# Patient Record
Sex: MALE | Race: WHITE
[De-identification: names, ages, dates, MRNs, and addresses within clinical notes are randomized per-mention and may not be internally consistent; named-entity substitution may affect disease eponyms.]

---

## 2019-10-22 NOTE — ED GU-MALE
General


Stated Complaint:  UTI


Source:  patient


Exam Limitations:  no limitations





History of Present Illness


Date Seen by Provider:  Oct 22, 2019


Time Seen by Provider:  23:20


Initial Comments


The patient is a pleasant 69-year-old male presents for evaluation of 6 days of 

dysuria. He states that last week Wednesday he went to an urgent care for 

dysuria, was diagnosed with UTI, and was sent home with 3 days of antibiotics. 

He is not sure which antibiotic he was prescribed but he states that he 

completed. A few days later his symptoms returned and now he states he is 

urinating every 5 minutes. He denies fevers or chills, back or flank pain, 

abdominal pain, nausea or vomiting, penile or testicular pain, or hematuria.





The pt is later able to tell us he was placed on Macrobid.


Timing/Duration:  week (1)


Severity/Quality:  moderate


Location:  suprapubic


Radiation:  none


Activities at Onset:  none


Prior Genitourinary Problems:  none


Associated Symptoms:  dysuria





Allergies and Home Medications


Allergies


Coded Allergies:  


     No Known Drug Allergies (Unverified , 10/22/19)





Patient Home Medication List


Home Medication List Reviewed:  Yes





Review of Systems


Review of Systems


Constitutional:  no symptoms reported


EENTM:  no symptoms reported


Respiratory:  no symptoms reported


Cardiovascular:  no symptoms reported


Gastrointestinal:  no symptoms reported


Genitourinary:  burning, dysuria, urgency


Musculoskeletal:  no symptoms reported


Skin:  no symptoms reported


Psychiatric/Neurological:  No Symptoms Reported


Endocrine:  No Symptoms Reported


Hematologic/Lymphatic:  No Symptoms Reported





All Other Systemes Reviewed


Negative Unless Noted:  Yes





Past Medical-Social-Family Hx


Past Med/Social Hx:  Reviewed Nursing Past Med/Soc Hx


Patient Social History


Recent Foreign Travel:  No


Contact w/Someone Who Travel:  No





Physical Exam


Vital Signs





Vital Signs - First Documented








 10/22/19





 23:37


 


Temp 36.9


 


Pulse 51


 


Resp 18


 


B/P (MAP) 158/71 (100)


 


Pulse Ox 98


 


O2 Delivery Room Air





Capillary Refill :


Height, Weight, BMI


Height: '"


Weight: lbs. oz. kg;  BMI


Method:


General Appearance:  WD/WN, no apparent distress


HEENT:  PERRL/EOMI, pharynx normal


Neck:  non-tender, full range of motion


Cardiovascular:  regular rate, rhythm, no edema, no murmur


Respiratory:  chest non-tender, lungs clear, normal breath sounds, no 

respiratory distress, no accessory muscle use


Gastrointestinal:  normal bowel sounds, non tender, tenderness (mild suprapubic 

ttp)


Back:  normal inspection, no CVA tenderness, no vertebral tenderness


Extremities:  normal range of motion, no pedal edema, no calf tenderness


Neurologic/Psychiatric:  CNs II-XII nml as tested, no motor/sensory deficits, 

alert, normal mood/affect, oriented x 3


Skin:  normal color, warm/dry





Progress/Results/Core Measures


Suspected Sepsis


SIRS


Temperature: 


Pulse:  


Respiratory Rate: 


 


Blood Pressure  / 


Mean:





Results/Orders


Lab Results





Laboratory Tests








Test


 10/22/19


23:40 Range/Units


 


 


Urine Color PALE YELLOW   


 


Urine Clarity SL CLOUDY   


 


Urine pH 6.5  5-9  


 


Urine Specific Gravity <=1.005  1.016-1.022  


 


Urine Protein NEGATIVE  NEGATIVE  


 


Urine Glucose (UA) NEGATIVE  NEGATIVE  


 


Urine Ketones NEGATIVE  NEGATIVE  


 


Urine Nitrite NEGATIVE  NEGATIVE  


 


Urine Bilirubin NEGATIVE  NEGATIVE  


 


Urine Urobilinogen 0.2  NORMAL  MG/DL


 


Urine Leukocyte Esterase 2+ H NEGATIVE  


 


Urine RBC (Auto) 1+ H NEGATIVE  


 


Urine RBC 5-10 H  /HPF


 


Urine WBC >100 H  /HPF


 


Urine Squamous Epithelial


Cells NONE 


  /HPF





 


Urine Crystals NONE   /LPF


 


Urine Bacteria FEW H  /HPF


 


Urine Casts NONE   /LPF


 


Urine Mucus NEGATIVE   /LPF


 


Urine Culture Indicated YES   








My Orders





Orders - HUSEYIN BRIGGS DO


Ua Culture If Indicated (10/22/19 23:17)


Urine Culture (10/22/19 23:40)





Vital Signs/I&O











 10/22/19





 23:37


 


Temp 36.9


 


Pulse 51


 


Resp 18


 


B/P (MAP) 158/71 (100)


 


Pulse Ox 98


 


O2 Delivery Room Air





Capillary Refill :


Progress Note :  


Progress Note


@2355 - the patient has evidence of significant UTI. He will go home with a 

prescription for ciprofloxacin and will be given one tablet prior to discharge. 

Advised the patient to follow up with his PCP in the next 1-2 days and to return

to the emergency Department immediately for new or worsening symptoms.





Departure


Impression





   Primary Impression:  


   Acute UTI


Disposition:  01 HOME, SELF-CARE


Condition:  Stable





Departure-Patient Inst.


Decision time for Depature:  00:00


Referrals:  


NO,LOCAL PHYSICIAN (PCP)


Primary Care Physician


Patient Instructions:  Urinary Tract Infection, Adult (DC)





Add. Discharge Instructions:  


Take the prescribed medicine instructed. Return to the emergency Department 

immediately for new or worsening symptoms. Follow-up with your doctor in the 

next 1-2 days.


Scripts


Phenazopyridine HCl (Pyridium) 200 Mg Tablet


1 TAB PO Q8H for 5 Days, #15 TAB


   Prov: HUSEYIN BRIGGS DO         10/23/19 


Ciprofloxacin HCl (Ciprofloxacin HCl) 500 Mg Tablet


500 MG PO BID for 7 Days, #14 TAB


   Prov: HUSEYIN BRIGGS DO         10/23/19











HUSEYIN BRIGGS DO              Oct 22, 2019 23:33

## 2019-12-17 NOTE — DIAGNOSTIC IMAGING REPORT
MRI LT LOWER EXT JOINT W/O



TECHNIQUE: Multiplanar, multisequence MR imaging of the left knee

was performed without contrast.



COMPARISON: None available. 



INDICATION: Left knee pain. No known injury.



FINDINGS:



MENISCI 

Medial meniscus: Complex tear of the posterior horn of the medial

meniscus has a vertical longitudinal component near the free edge

resulting in a parrot beak configuration. There is also

horizontal cleavage tearing present in the undersurface.

Lateral meniscus: Normal.



LIGAMENTS

ACL: Intact.

PCL: Intact.

MCL: Intact.

LCL: The lateral collateral ligamentous complex is intact.



EXTENSOR MECHANISM

The extensor mechanism is intact.



CARTILAGE

Medial compartment: Medial compartment articular cartilage is

well preserved without focal high-grade chondromalacia.

Lateral compartment: The lateral compartment articular cartilage

is preserved without high-grade chondromalacia.

Patellofemoral compartment: Focal partial-thickness chondral

fibrillation and fissuring at the patellar apex. Remainder of the

articular cartilage throughout the patellofemoral compartment is

preserved.



BONE

No fracture, stress fracture or osteonecrosis. 



SOFT TISSUE

No knee effusion or Baker's cyst.



IMPRESSION: 

1. Complex tear in the posterior horn of the medial meniscus

likely has an unstable free edge margin.

2. Focal partial-thickness chondromalacia at the patellar apex.

Otherwise, articular cartilage throughout the knee is well

preserved.



Dictated by: 



  Dictated on workstation # [id number]

## 2019-12-26 NOTE — HISTORY AND PHYSICAL
DATE OF SERVICE:  



ADMISSION HISTORY AND PHYSICAL



Date of service and surgery will be 01/08/2020 for left knee arthroscopy.



HISTORY:

The patient is a 69-year-old gentleman with complaints of left knee pain.  He

reports pain on the medial aspect of his knee, worse with pivoting and twisting.

 He is very active.  He underwent an MRI shows a complex tear of the medial

meniscus.  Due to functional impairment and failure to improve with conservative

measures, the patient elected to proceed with surgical intervention.



REVIEW OF SYSTEMS:

No chest pain, no shortness of breath, no dysuria.



PAST MEDICAL HISTORY:

BPH, coronary artery disease, trigger finger.



PAST SURGICAL HISTORY:

Appendectomy, trigger finger release, coronary stent placement.



FAMILY HISTORY:

Significant for kidney stones, prostate cancer.



PRIMARY CARE PROVIDER:

_____.



MEDICATIONS:

Finasteride, atorvastatin, aspirin, isosorbide, clopidogrel, tamsulosin,

nitroglycerin.



ALLERGIES:

No known drug allergies.



SOCIAL HISTORY:

The patient denies alcohol, tobacco use.



RADIOGRAPHS:

Reveal mild medial and patellofemoral joint space narrowing.



PHYSICAL EXAMINATION:

GENERAL:  The patient is well developed, well nourished, in no acute distress.

HEENT:  Normocephalic, atraumatic.  Pupils are equal, round, reactive to light. 

Oropharynx is clear.

NECK:  Supple, no lymphadenopathy.

LUNGS:  Clear to auscultation bilaterally.

HEART:  Regular rate and rhythm.

ABDOMEN:  Soft, nontender, nondistended.

EXTREMITIES:  The left knee demonstrates moderate effusions, tender along his

medial joint line pain medially with Ale's.  No varus valgus laxity. 

Negative anterior and posterior drawer.  Range of motion is 0/2/125.



IMPRESSION:

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



PLAN:

Left knee arthroscopy, chondroplasty, partial medial meniscectomy.  The risks,

benefits, options, ramifications and recovery were discussed at length with the

patient.  He understands and wishes to proceed.





Job ID: [id number]

DocumentID: [id number]

Dictated Date:  12/26/2019 13:03:12

Transcription Date: 12/26/2019 13:50:29

Dictated By: DEIDRA FREITAS MD

## 2020-01-02 ENCOUNTER — HOSPITAL ENCOUNTER (OUTPATIENT)
Dept: HOSPITAL 75 - PREOP | Age: 70
Discharge: HOME | End: 2020-01-02
Attending: ORTHOPAEDIC SURGERY
Payer: MEDICARE

## 2020-01-02 VITALS — SYSTOLIC BLOOD PRESSURE: 117 MMHG | DIASTOLIC BLOOD PRESSURE: 66 MMHG

## 2020-01-02 VITALS — WEIGHT: 205.69 LBS | BODY MASS INDEX: 30.47 KG/M2 | HEIGHT: 68.9 IN

## 2020-01-02 DIAGNOSIS — Z01.818: Primary | ICD-10-CM

## 2020-01-02 PROCEDURE — 87081 CULTURE SCREEN ONLY: CPT

## 2020-01-08 ENCOUNTER — HOSPITAL ENCOUNTER (OUTPATIENT)
Dept: HOSPITAL 75 - SDC | Age: 70
Discharge: HOME | End: 2020-01-08
Attending: ORTHOPAEDIC SURGERY
Payer: MEDICARE

## 2020-01-08 VITALS — SYSTOLIC BLOOD PRESSURE: 114 MMHG | DIASTOLIC BLOOD PRESSURE: 67 MMHG

## 2020-01-08 VITALS — DIASTOLIC BLOOD PRESSURE: 72 MMHG | SYSTOLIC BLOOD PRESSURE: 118 MMHG

## 2020-01-08 VITALS — BODY MASS INDEX: 28.38 KG/M2 | WEIGHT: 191.58 LBS | HEIGHT: 69.02 IN

## 2020-01-08 VITALS — DIASTOLIC BLOOD PRESSURE: 58 MMHG | SYSTOLIC BLOOD PRESSURE: 97 MMHG

## 2020-01-08 VITALS — SYSTOLIC BLOOD PRESSURE: 137 MMHG | DIASTOLIC BLOOD PRESSURE: 72 MMHG

## 2020-01-08 VITALS — DIASTOLIC BLOOD PRESSURE: 56 MMHG | SYSTOLIC BLOOD PRESSURE: 95 MMHG

## 2020-01-08 VITALS — SYSTOLIC BLOOD PRESSURE: 124 MMHG | DIASTOLIC BLOOD PRESSURE: 69 MMHG

## 2020-01-08 VITALS — DIASTOLIC BLOOD PRESSURE: 58 MMHG | SYSTOLIC BLOOD PRESSURE: 134 MMHG

## 2020-01-08 VITALS — SYSTOLIC BLOOD PRESSURE: 122 MMHG | DIASTOLIC BLOOD PRESSURE: 73 MMHG

## 2020-01-08 VITALS — SYSTOLIC BLOOD PRESSURE: 128 MMHG | DIASTOLIC BLOOD PRESSURE: 73 MMHG

## 2020-01-08 VITALS — DIASTOLIC BLOOD PRESSURE: 73 MMHG | SYSTOLIC BLOOD PRESSURE: 143 MMHG

## 2020-01-08 VITALS — DIASTOLIC BLOOD PRESSURE: 73 MMHG | SYSTOLIC BLOOD PRESSURE: 122 MMHG

## 2020-01-08 DIAGNOSIS — M94.262: ICD-10-CM

## 2020-01-08 DIAGNOSIS — Z82.49: ICD-10-CM

## 2020-01-08 DIAGNOSIS — Z79.02: ICD-10-CM

## 2020-01-08 DIAGNOSIS — Z80.42: ICD-10-CM

## 2020-01-08 DIAGNOSIS — I25.10: ICD-10-CM

## 2020-01-08 DIAGNOSIS — Z79.899: ICD-10-CM

## 2020-01-08 DIAGNOSIS — S83.232A: Primary | ICD-10-CM

## 2020-01-08 DIAGNOSIS — Z90.89: ICD-10-CM

## 2020-01-08 NOTE — PHYSICAL THERAPY ORTHO EVAL
PT Orthopedic Evaluation


Type of Surgery


Knee Scope (left)





Prior Level of Function


Current Living Status:  Spouse


Locomotion     (Upon Admit):  Independent


Established Durable Medical Eq:  Quad Cane


Pt has a 3 prong cane at home.





Subjective


Subjective


Agrees to PT. Reports he can get additional medical equipment if needed.  he has

a 3 prong cane at home.


Entry Into Home:  Stairs With Railing


Steps Into Home:  3





Motor Control


Motor Control:  Motor Control WNL





ROM


ROM:  WFL





Strength


Strength:  WFL





Transfer


Transfers (B, C, W/C) (FIM):  5 (indep at discharge. )





Gait


Right Lower Extremity:  Right


Weight Bearing Status RLE:  Full Weight Bearing


Left Lower Extremity:  Left


Weight Bearing Status LLE:  Weight Bearing/Tolerated


Gait (FIM):  5 (mod indep at discharge with a QC)


Distance (FIM):  0=does not occure


Summary/Comments


Pt able to safely ambulate with a quad cane and often holds it in the air.





Treatment Rendered


Treatment:  Gait Train, Step Train


Exercise Instruction:  Quad Sets, Straight Leg Raise, Heel Slides





Assessment/Goals


Goal Time Frame:  1 Visit


Understands HEP:  Yes


Safe Ambulation:  Yes





Plan


Treatment Plan:  Discharge


Instructed pt in HEP. Gait training with education on use of AD; step training. 

Pt verbalized and demonstrated understanding of all.


PT/Family Agrees to Plan:  Yes





Time


Time In:  950


Time Out:  1010


Total Billed Treatment Time:  20


Billed Treatment Time


visit


EVL 20











LISA ANTONIO PT              Jan 8, 2020 10:39

## 2020-01-08 NOTE — OPERATIVE REPORT
DATE OF SERVICE:  



PREOPERATIVE DIAGNOSIS:

Left knee medial meniscus tear.



POSTOPERATIVE DIAGNOSES:

1.  Left knee medial meniscus tear.

2.  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



PROCEDURE:

1.  Left knee arthroscopic partial medial meniscectomy.

2.  Left knee arthroscopic chondroplasty of the medial femoral condyle.



SURGEON:

Martin Freitas MD



ASSISTANT:

Doroteo Urena, who assisted throughout the procedure and closed the incisions.



ANESTHESIA:

General endotracheal by Dr. Gonzalez.



TOURNIQUET TIME:

Not applicable.



ESTIMATED BLOOD LOSS:

Minimal.



DRAINS:

None.



COMPLICATIONS:

None.



POSTOPERATIVE PLAN:

Routine arthroscopy protocol.  The patient was transferred to the recovery room

awake and in stable condition.



STATEMENT OF MEDICAL NECESSITY:

The patient is a 69-year-old gentleman with complaints of left medial knee pain,

catching, locking and swelling.  He tried rest, activity modifications,

anti-inflammatories.  An MRI revealed a medial meniscus tear.  Due to functional

impairment and failure to improve with conservative measures, the patient

elected to proceed with surgical intervention.  Examination under anesthesia

revealed range of motion of 0/0/135 with negative Lachman, negative anterior and

posterior drawer.  No varus valgus laxity and negative pivot shift. 

Arthroscopic findings of patella and trochlea demonstrated no gross chondral

abnormalities.  Medial and lateral gutters were clear.  The ACL and PCL were

intact.  Lateral compartment demonstrated no meniscal or chondral pathology. 

The medial compartment demonstrated a complex tear of the posterior horn of the

medial meniscus involving approximately one-half of the posterior horn.  There

was grade II chondral flap over the central weightbearing portion of the femoral

condyle in an 8 x 8 area.



PROCEDURE IN DETAIL:

After risks and benefits of procedure were discussed and questions were answered

and informed consent was signed and placed on chart, the operative site was

confirmed in the preoperative holding area initialed by the surgeon.  The

patient was transferred to the operating room and after adequate levels of

general endotracheal anesthetic were obtained, a timeout was called, confirming

the operative site.  The left lower extremity was prepped and draped in the

usual sterile fashion.  The knee joint was injected with 60 mL of fluid and

standard inferolateral portal was placed with the arthroscope under direct

visualization, inferior medial portal was created.  The menisci and cruciates

carefully probed with the above findings noted.  The unstable chondral flaps of

the medial femoral condyle were debrided with shaver back to a stable edge.  The

scope was redirected posteriorly where the posterior horn of the medial meniscus

was debrided with a biter and shaver removing approximately 1/2 of the posterior

horn.  This was carefully probed with no further tearing or instability noted. 

The knee was copiously irrigated.  The portal sites were closed with 4-0 nylon

in simple interrupted fashion.  Knee was injected with Duramorph.  Portal sites

were infiltrated with plain Marcaine.  A soft dressing was applied.  The patient

was transferred to the recovery room awake and in stable condition.





Job ID: [id number]

DocumentID: [id number]

Dictated Date:  01/08/2020 08:06:54

Transcription Date: 01/08/2020 12:56:00

Dictated By: MARTIN FREITAS MD

## 2020-01-08 NOTE — PROGRESS NOTE-PRE OPERATIVE
Pre-Operative Progress Note


H&P Reviewed


The H&P was reviewed, patient examined and no changes noted.


Date Seen by Provider:  Jan 8, 2020


Time Seen by Provider:  07:20


Date H&P Reviewed:  Jan 8, 2020


Time H&P Reviewed:  07:11


Pre-Operative Diagnosis:  left knee medial meniscus tear and chondromalacia











DEIDRA FREITAS MD             Jan 8, 2020 07:29

## 2020-01-08 NOTE — PROGRESS NOTE-POST OPERATIVE
Post-Operative Progess Note


Surgeon (s)/Assistant (s)


Surgeon


DEIDRA FREITAS MD


Assistant:  Doroteo Urena





Pre-Operative Diagnosis


left knee medial meniscus tear and chondromalacia





Post-Operative Diagnosis





left knee medial meniscus tear and chondromalacia of the medial femoral


condyle





Procedure & Operative Findings


Date of Procedure


1/8/20


Procedure Performed/Findings


left knee arthroscopic partial medial meniscectomy and chondroplasty of the 

medial femoral condyle


Anesthesia Type


GETA





Estimated Blood Loss


Estimated blood loss (mL):  minimal





Specimens/Packing


Specimens Removed


none


Packing:  


none











DEIDRA FREITAS MD             Jan 8, 2020 07:30

## 2020-01-08 NOTE — ANESTHESIA-GENERAL POST-OP
General


Patient Condition


Mental Status/LOC:  Same as Preop


Cardiovascular:  Satisfactory


Nausea/Vomiting:  Absent


Respiratory:  Satisfactory


Pain:  Controlled


Complications:  Absent





Post Op Complications


Complications


None





Follow Up Care/Instructions


Patient Instructions


None needed.





Anesthesia/Patient Condition


Patient Condition


Patient was seen this morning after the procedure and he was doing well, no 

complaints, stable vital signs, no apparent adverse anesthesia problems.











SULEIMAN NOGUEIRA DO          Jan 8, 2020 13:13

## 2021-05-27 ENCOUNTER — HOSPITAL ENCOUNTER (OUTPATIENT)
Dept: HOSPITAL 75 - RAD FS | Age: 71
End: 2021-05-27
Attending: NURSE PRACTITIONER
Payer: MEDICARE

## 2021-05-27 DIAGNOSIS — M25.521: Primary | ICD-10-CM

## 2021-05-27 PROCEDURE — 73080 X-RAY EXAM OF ELBOW: CPT

## 2021-05-27 NOTE — DIAGNOSTIC IMAGING REPORT
INDICATION: Right elbow pain. 



COMPARISON: None.



FINDINGS: Multiple radiographic views of the right elbow show no

fractures, dislocations, or other acute bony abnormalities

identified. Joint spaces are well maintained throughout. The soft

tissues appear unremarkable. No radiopaque foreign bodies are

identified.



IMPRESSION: No acute fractures or dislocations of the right

elbow.



Dictated by: 



  Dictated on workstation # [id number]

## 2022-11-08 ENCOUNTER — HOSPITAL ENCOUNTER (OUTPATIENT)
Dept: HOSPITAL 75 - LAB FS | Age: 72
End: 2022-11-08
Attending: REGISTERED NURSE
Payer: MEDICARE

## 2022-11-08 DIAGNOSIS — R41.3: Primary | ICD-10-CM

## 2022-11-08 PROCEDURE — 36415 COLL VENOUS BLD VENIPUNCTURE: CPT

## 2022-11-08 PROCEDURE — 82607 VITAMIN B-12: CPT

## 2022-11-08 PROCEDURE — 84443 ASSAY THYROID STIM HORMONE: CPT

## 2023-01-04 ENCOUNTER — HOSPITAL ENCOUNTER (EMERGENCY)
Dept: HOSPITAL 75 - ER FS | Age: 73
Discharge: HOME | End: 2023-01-04
Payer: COMMERCIAL

## 2023-01-04 VITALS — SYSTOLIC BLOOD PRESSURE: 153 MMHG | DIASTOLIC BLOOD PRESSURE: 83 MMHG

## 2023-01-04 VITALS — BODY MASS INDEX: 29.39 KG/M2 | WEIGHT: 198.42 LBS | HEIGHT: 68.9 IN
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
## 2023-01-04 DIAGNOSIS — Z23: ICD-10-CM

## 2023-01-04 DIAGNOSIS — S92.531B: Primary | ICD-10-CM

## 2023-01-04 PROCEDURE — 90715 TDAP VACCINE 7 YRS/> IM: CPT

## 2023-01-04 PROCEDURE — 12041 INTMD RPR N-HF/GENIT 2.5CM/<: CPT

## 2023-01-04 PROCEDURE — 73660 X-RAY EXAM OF TOE(S): CPT

## 2023-01-04 NOTE — DIAGNOSTIC IMAGING REPORT
INDICATION: Injury to right 2nd toe



AP, oblique, and lateral views of the right toes are obtained.



There is a fracture of the 2nd distal phalanx with horizontal

orientation, with avulsion of the distal tuft. Remaining

structures are intact. Joint spaces are unremarkable.



IMPRESSION:



Fracture of 2nd distal phalanx as described above.



Dictated by: 



  Dictated on workstation # [id number]

## 2023-01-04 NOTE — ED LOWER EXTREMITY
General


Chief Complaint:  Lower Extremity


Stated Complaint:  WC RT TOE INJ


Nursing Triage Note:  


Patient has presented to ER with of right foot injury - he works for the [employer] 


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 


of the pile and it fell onto his right foot.  HE has a laceration under his 2nd 


right toe.


Source:  patient


Exam Limitations:  no limitations





History of Present Illness


Date Seen by Provider:  Jan 4, 2023


Time Seen by Provider:  10:10


Initial Comments


Patient is a 72-year-old male presents with right toe injury.  Patient works for

the [employer] and he broke up large pile of [employer] salt which fell on his foot 

lacerating his toe pad under his right toe.  Injury occurred just prior to ED 

arrival.  Some isolated injury.  Patient's tetanus is up-to-date.


Onset:  just prior to arrival


Severity:  mild


Pain/Injury Location:  right 2nd toe


Method of Injury:  other


Modifying Factors:  Improves With Other





Allergies and Home Medications


Allergies


Coded Allergies:  


     tramadol (Verified  Allergy, Mild, N/V, 1/2/20)





Patient Home Medication List


Home Medication List Reviewed:  Yes


Aspirin (Aspir 81) 81 Mg Tablet.dr, 81 MG PO DAILY, (Reported)


   Entered as Reported by: ANDIE NEIL on 1/2/20 1301


Atorvastatin Calcium (Atorvastatin Calcium) 80 Mg Tablet, 80 MG PO HS, 

(Reported)


   Entered as Reported by: JOVANY GRIMALDO on 10/22/19 2349


Clopidogrel Bisulfate (Clopidogrel) 75 Mg Tablet, 75 MG PO DAILY, (Reported)


   Entered as Reported by: ANDIE NEIL on 1/2/20 1301


Finasteride (Finasteride) 5 Mg Tablet, 5 MG PO HS, (Reported)


   Entered as Reported by: ANDIE NEIL on 1/2/20 1301


Hydrocodone Bit/Acetaminophen (Lortab  7.5 Mg Tablet) 1 Ea Tablet, 1 EA PO Q4H 

PRN for PAIN-MODERATE


   Prescribed by: CONRADO DICKINSON on 1/8/20 0914


Isosorbide Mononitrate (Isosorbide Mononitrate ER) 30 Mg Tab.er.24h, 60 MG PO 

DAILY, (Reported)


   Entered as Reported by: ANDIE NEIL on 1/2/20 1301


Nitroglycerin (Nitroglycerin) 0.4 Mg Tab.subl, 0.4 MG SL PRN, (Reported)


   Entered as Reported by: ANDIE NEIL on 1/2/20 1301


Tamsulosin HCl (Flomax) 0.4 Mg Cap, 0.4 MG PO DAILY, (Reported)


   Entered as Reported by: ANDIE NEIL on 1/2/20 1301





Review of Systems


Constitutional:  see HPI


Musculoskeletal:  see HPI





Past Medical-Social-Family Hx


Patient Social History


Tobacco Use?:  No


Substance use?:  No


Alcohol Use?:  No





Immunizations Up To Date


Tetanus Booster (TDap):  Unknown





Seasonal Allergies


Seasonal Allergies:  No





Past Medical History


Surgeries:  Yes (CARDIAC STENTS X2, FINGER AMPUTATION, TENDON L THUMB)


Appendectomy


Respiratory:  No


Currently Using CPAP:  No


Currently Using BIPAP:  No


Cardiac:  Yes


Coronary Artery Disease, High Cholesterol


Neurological:  No


Sexually Transmitted Disease:  No


HIV/AIDS:  No


Genitourinary:  No


Benign Prostatic Hyperpl, Bladder Infection


Gastrointestinal:  No


Musculoskeletal:  No


Endocrine:  No


HEENT:  Yes (GLASSES)


Loss of Vision:  Denies


Hearing Impairment:  Denies


Cancer:  Yes


Skin


Psychosocial:  No


Integumentary:  No


Blood Disorders:  No


Adverse Reaction/Blood Tranf:  No (N/A)





Physical Exam


Vital Signs





Vital Signs - First Documented








 1/4/23





 10:01


 


Temp 36.4


 


Pulse 83


 


B/P (MAP) 153/83 (106)


 


Pulse Ox 98


 


O2 Delivery Room Air





Capillary Refill :


Height, Weight, BMI


Height: '"


Weight: lbs. oz. kg; 29.00 BMI


Method:


General Appearance:  WD/WN, no apparent distress


Feet:  right foot soft tissue tenderness, right foot other (2 cm full-thickness 

semicircumferential laceration under right toe pad extending to medial dorsal 

surface.  No joint or nail involvement.  No exposed bone)





Procedures/Interventions





   Wound Location:  Lower Extremities (2 cm full-thickness semicircumferential 

laceration after right second toe,)


   Wound Length (cm):  2


   Wound's Depth, Shape:  sub Q


   Wound Explored:  clean


   Anesthesia:  1% Lidocaine


   Wound Debrided:  moderate


   Suture:  Ethlion


   Suture Size:  5-0


   Number of Sutures:  6 (Running interlocked)


Progress


Wound cleansed and closed.





Progress/Results/Core Measures


Results/Orders


My Orders





Orders - VALDES,GAUTAM DO


Toe(S) (1/4/23 10:30)


Ed Ortho/Other Supplies Order (1/4/23 10:30)


Dipht,Pertuss(Acell),Tet Adult (Boostrix (1/4/23 10:45)


Cephalexin Capsule (Keflex Capsule) (1/4/23 10:45)





Vital Signs/I&O











 1/4/23





 10:01


 


Temp 36.4


 


Pulse 83


 


B/P (MAP) 153/83 (106)


 


Pulse Ox 98


 


O2 Delivery Room Air














Blood Pressure Mean:                    106











Departure


Communication (Admissions)


X-ray toe: Fracture distal phalanx of second toe





Open fracture of right second toe.  Wound extensively cleansed, closed, bandaged

and placed in shoe splint.  Tetanus updated and first dose of antibiotics given.

 Typical wound care instructions provided.  Return precautions reviewed.  

Patient verbalizes understanding agreement discharge instructions prior to 

departure.





Impression





   Primary Impression:  


   Laceration of toe of right foot


   Additional Impression:  


   Fracture of toe of right foot


Disposition:  01 HOME, SELF-CARE


Condition:  Stable





Departure-Patient Inst.


Decision time for Depature:  10:54


Referrals:  


CHUY GUERRERO (PCP)


Primary Care Physician








LEXIS VANG MD (Family)


Primary Care Physician


Patient Instructions:  Toe Fracture ED, Laceration Repair With Stitches (DC)





Add. Discharge Instructions:  


Please keep wound clean dry and covered.  Do not get toe wet.  Take Tylenol for 

pain and antibiotics as directed.  Return to the ED in 10 to 12 days for suture 

removal or sooner if signs of infection wear.





All discharge instructions reviewed with patient and/or family. Voiced 

understanding.


Scripts


Cephalexin (Cephalexin) 500 Mg Tablet


500 MG PO TID, #21 TAB


   Prov: GAUTAM VALDES DO         1/4/23











GAUTAM VALDES DO                    Jan 4, 2023 10:39

## 2023-01-17 ENCOUNTER — HOSPITAL ENCOUNTER (EMERGENCY)
Dept: HOSPITAL 75 - ER FS | Age: 73
Discharge: HOME | End: 2023-01-17
Payer: COMMERCIAL

## 2023-01-17 VITALS — WEIGHT: 190.04 LBS | HEIGHT: 69.02 IN | BODY MASS INDEX: 28.15 KG/M2

## 2023-01-17 VITALS — DIASTOLIC BLOOD PRESSURE: 74 MMHG | SYSTOLIC BLOOD PRESSURE: 137 MMHG

## 2023-01-17 DIAGNOSIS — S91.114D: Primary | ICD-10-CM

## 2023-01-17 NOTE — ED SUTURE REMOVAL/WOUND CHECK
Suture/Wound Re-check


Suture Removal/Wound Recheck :  


   Suture Removal/Wound Recheck:  Sutures removed by MD Damon


I personally removed the stitches on his right 2nd toe. Pt did have tenderness 

to palpation of the distal phalanx of the toe where he had a fracture from this 

injury. No evidence of infection. Wound appears to be healed. Counseled on 

follow up and return precautions and to check with PCP if having continued 

pain/concerns.


General Appearance:  WD/WN, no apparent distress


Neuro/Tendon:  normal sensation, normal motor functions, normal tendon functions


Skin Exam:  warm/dry, ecchymosis (mild bruising and swelling to distal phalanx 

2nd toe right foot)





Physical Exam


Vital Signs





Vital Signs - First Documented








 1/17/23





 07:48


 


Temp 35.9


 


Pulse 59


 


Resp 18


 


B/P (MAP) 137/74


 


Pulse Ox 98


 


O2 Delivery Room Air





Capillary Refill :


General Appearance:  WD/WN, no apparent distress


Cardiovascular:  normal peripheral pulses


Skin:  warm/dry, ecchymosis (mild bruising to right 2nd toe distal phalanx with 

mild swelling)


Skin Problem Location:  lower extremities (right 2nd toe plantar surface at DIP 

joint healing wound with stitches in place. )


Skin Problem Character:  tenderness (tender to palpation of distal phalanx right

2nd big toe)





Departure


Impression





   Primary Impression:  


   Encounter for removal of sutures


Disposition:  01 HOME, SELF-CARE


Condition:  Stable





Departure-Patient Inst.


Decision time for Depature:  07:41


Referrals:  


LEXIS VANG MD (Family)


Primary Care Physician








CHUY GUERRERO (PCP)


Primary Care Physician


Patient Instructions:  SUTURE REMOVAL-UNCOMPLICATED





Add. Discharge Instructions:  


Follow up with clinic for continued concerns





All discharge instructions reviewed with patient and/or family. Voiced 

understanding.











TAYLOR COTO MD               Jan 17, 2023 07:41